# Patient Record
(demographics unavailable — no encounter records)

---

## 2024-11-01 NOTE — PHYSICAL EXAM
[de-identified] : RIGHT HIP Inc Clean and dry no drainage - dressing removed - good ROM Sensation intact neg impingement Motor 5/5 to LE with some weakness to hip flexor  +1 edema LE  Xray 3 RIGHT HIP views hip/pelvis - Implants good position and well fixed - no fracture or dislocation and Leg length wnl

## 2024-11-01 NOTE — HISTORY OF PRESENT ILLNESS
[Dull/Aching] : dull/aching [de-identified] : 11/1/24: PO #1. 16 days s/p R KASIE. Finished at home PT- feels it was beneficial. Getting better every day, but pain is already less now compared to prior to surgery. No relief with oxy, but good relief and help sleeping with Vicodin. Ambulates without assistance.   Previous doc:  2/27/24: 60yo M with right hip/leg pain for the past ~8 months with no injury. Pain radiates into his thigh. Has some groin pain. States he has known OA in the R hip. 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 Admits to increasing pain and difficulty with prolonged walking/standing, startup, and stairs. He was worked up for the L-spine- had EMG, XR done. Had a LESI done by outside pain management that did not provide sig relief. Some relief with heat and stretching  7/2/24: f/up R hip. He had IA hip CSI ~3 months ago - pain has gradually returned over the past month. He is also having some right knee pain and notes occasional "popping." Has relief with bracing.   [de-identified] : home therapy  [de-identified] : MAYELIN FERRO

## 2024-11-01 NOTE — DISCUSSION/SUMMARY
[de-identified] : We discussed the patient's progress.  The patient was reminded of their hip dislocation precautions and their antibiotic prophylaxis.  We discussed continued physical therapy and/or a home exercise program.  Questions about their hip replacement and future follow up were answered and discussed. The incision was inspected and was clean and dry with no drainage.  The patient was instructed to call for fevers, chills, wound drainage, wound opening, redness, or any other concerns.  Entered by Diandra Phillips acting as a scribe.

## 2024-11-01 NOTE — ASSESSMENT
[FreeTextEntry1] : 2/27/24: Adv R hip OA. Discussed anti-inflammatories, PT/HEP, IA hip CSI, and briefly KASIE. He had prior workup and treatment of his L-spine (XR, EMG, LESI) with no sig relief. He is well informed and would like to proceed with PT/HEP and IA right hip CSI with Dr. Kimball at this point. f/up in 6 weeks, may discuss KASIE depending on how he's doing  7/2/24: Had relief in the hip with IA right hip CSI with Dr. Kimball until recently. Will set him up for repeat IA hip CSI. He would also like to plan for R KASIE in October (after 10/11/24). Discussed KASIE, r/b/a, and preop/postop periods in detail. Will obtain CT Right Hip to eval for bone loss and for presurgical eval.   11/1/24: PO #1. 2 weeks s/p R KASIE. XR look good. Has good motion and function, pain is tolerable. Given Rx for outpatient PT. He expressed interest in driving to [place] sometime in December- discussed risk of blood clots due to prolonged periods of sitting- told him to try and get up and move every hour- will be about 6 weeks postop by the time he goes to [place]. F/up 6 weeks for re eval or before he goes to [place]

## 2024-12-03 NOTE — HISTORY OF PRESENT ILLNESS
[de-identified] : 12/3/24: 2 months postop min pain.  Some left knee pain that started after doing PT since last visit.  Slow improvement with heat and exercises.  Previous doc:  2/27/24: 62yo M with right hip/leg pain for the past ~8 months with no injury. Pain radiates into his thigh. Has some groin pain. States he has known OA in the R hip. 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 Admits to increasing pain and difficulty with prolonged walking/standing, startup, and stairs. He was worked up for the L-spine- had EMG, XR done. Had a LESI done by outside pain management that did not provide sig relief. Some relief with heat and stretching  7/2/24: f/up R hip. He had IA hip CSI ~3 months ago - pain has gradually returned over the past month. He is also having some right knee pain and notes occasional "popping." Has relief with bracing.  11/1/24: PO #1. 16 days s/p R KASIE. Finished at home PT- feels it was beneficial. Getting better every day, but pain is already less now compared to prior to surgery. No relief with oxy, but good relief and help sleeping with Vicodin. Ambulates without assistance.  [de-identified] : doing pt

## 2024-12-03 NOTE — ASSESSMENT
[FreeTextEntry1] : Previous doc: 2/27/24: Adv R hip OA. Discussed anti-inflammatories, PT/HEP, IA hip CSI, and briefly KASIE. He had prior workup and treatment of his L-spine (XR, EMG, LESI) with no sig relief. He is well informed and would like to proceed with PT/HEP and IA right hip CSI with Dr. Kimball at this point. f/up in 6 weeks, may discuss KASIE depending on how he's doing  7/2/24: Had relief in the hip with IA right hip CSI with Dr. Kimball until recently. Will set him up for repeat IA hip CSI. He would also like to plan for R KASIE in October (after 10/11/24). Discussed KASIE, r/b/a, and preop/postop periods in detail. Will obtain CT Right Hip to eval for bone loss and for presurgical eval.  11/1/24: PO #1. 2 weeks s/p R KASIE. XR look good. Has good motion and function, pain is tolerable. Given Rx for outpatient PT. He expressed interest in driving to [place] sometime in December- discussed risk of blood clots due to prolonged periods of sitting- told him to try and get up and move every hour- will be about 6 weeks postop by the time he goes to [place]. F/up 6 weeks for re eval or before he goes to [place]  12/3/24: 2 months postop doing very well.  Left knee mild OA exacerbated with PT, improving, declined inj at this point.

## 2024-12-03 NOTE — DISCUSSION/SUMMARY
[de-identified] : The patient was advised of the diagnosis.  The natural history of the pathology was explained in full to the patient in layman's terms. All questions were answered.  The risks and benefits of surgical and non-surgical treatment alternatives were explained in full to the patient.dx  I saw the patient under the supervision of Dr. De Jesus and followed his plan of care.

## 2024-12-03 NOTE — PHYSICAL EXAM
[Right] : right hip with pelvis [Components well fixed, in good position] : Components well fixed, in good position [Left] : left knee [AP] : anteroposterior [Lateral] : lateral [[place]] : skyline [Mild tricompartmental OA medial narrowing] : Mild tricompartmental OA medial narrowing [de-identified] : Right hip: No swelling.  Inc healed  No pain with ROM.  NVI.  Walks without assistance.  left knee: No effusion.  Medial joint line tenderness.  ROM 0-125.  Lig stable.  NVI.

## 2025-01-07 NOTE — ASSESSMENT
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 However, without typical features of DM such as perivascular inflammation of perimysium or perifascicular endomysium, perifascicular atrophy, capillary abnormalities or CD4 predominance. 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 = Switched to MTX in 2023 in clinical remission since =Muscle strength improved in all muscle groups, but with slight myalgias and residual weakness in lower extremities despite normalization of CK. Symptoms more likely attributable to moderate-advanced hip OA and/or lumbar radiculopathy (documented on EMG/NCV), however should rule out inflammatory myositis. 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 = Pt follows urology closely since before, no concern for occult prostate Ca per pt colonoscopy 2022 showing only diverticulosis  Plan: -check CBC CMP, and CK, repeat serology -cw mtx 8 tabs weekly but consider to slowly lower the dose to d/c -recommend PT but pt deferring  -Malignancy workup has been negative in [id number], will repeat CT chest / abdomen if repeat NXP2 positive -scheduled to see GI - may nee repeat endoscopy / colonoscopy  HM: = Refuses Pneumonia and Shingles vaccines. = DEXA 2022 - wnl, = Infection screenin2022 Quant, HBV HCV neg.  RTC 3 months.

## 2025-01-07 NOTE — HISTORY OF PRESENT ILLNESS
[de-identified] : Last was seen by Dr Vega in MAY, 2024 [FreeTextEntry1] : INTERVAL HISTORY : ================= has right THR on 10/16/2024- on intense PT , improving, but still difficulties with walking/ pain in knees no muscle pain, no muscle weakness continued on MTX 20 mg a week

## 2025-01-07 NOTE — HISTORY OF PRESENT ILLNESS
[de-identified] : Last was seen by Dr Vega in MAY, 2024 [FreeTextEntry1] : INTERVAL HISTORY : ================= has right THR on 10/16/2024- on intense PT , improving, but still difficulties with walking/ pain in knees no muscle pain, no muscle weakness continued on MTX 20 mg a week

## 2025-01-07 NOTE — ASSESSMENT
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 However, without typical features of DM such as perivascular inflammation of perimysium or perifascicular endomysium, perifascicular atrophy, capillary abnormalities or CD4 predominance. 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 = Switched to MTX in 2023 in clinical remission since =Muscle strength improved in all muscle groups, but with slight myalgias and residual weakness in lower extremities despite normalization of CK. Symptoms more likely attributable to moderate-advanced hip OA and/or lumbar radiculopathy (documented on EMG/NCV), however should rule out inflammatory myositis. 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 = Pt follows urology closely since before, no concern for occult prostate Ca per pt colonoscopy 2022 showing only diverticulosis  Plan: -check CBC CMP, and CK, repeat serology -cw mtx 8 tabs weekly but consider to slowly lower the dose to d/c -recommend PT but pt deferring  -Malignancy workup has been negative in [id number], will repeat CT chest / abdomen if repeat NXP2 positive -scheduled to see GI - may nee repeat endoscopy / colonoscopy  HM: = Refuses Pneumonia and Shingles vaccines. = DEXA 2022 - wnl, = Infection screenin2022 Quant, HBV HCV neg.  RTC 3 months.

## 2025-01-07 NOTE — PHYSICAL EXAM
[General Appearance - Alert] : alert [General Appearance - In No Acute Distress] : in no acute distress [Sclera] : the sclera and conjunctiva were normal [Extraocular Movements] : extraocular movements were intact [Outer Ear] : the ears and nose were normal in appearance [Neck Appearance] : the appearance of the neck was normal [Auscultation Breath Sounds / Voice Sounds] : lungs were clear to auscultation bilaterally [Heart Rate And Rhythm] : heart rate was normal and rhythm regular [Heart Sounds] : normal S1 and S2 [Abdomen Soft] : soft [Abdomen Tenderness] : non-tender [] : no rash [Sensation] : the sensory exam was normal to light touch and pinprick [Motor Exam] : the motor exam was normal [Impaired Insight] : insight and judgment were intact [FreeTextEntry1] : Difficulties getting up from sitting to standing position, mostly from knee pain; No synovitis. 5/5 muscle strength in neck, shoulders, hands, thighs, legs, feet. 4/5 Strength b/lL thigh.

## 2025-01-07 NOTE — REVIEW OF SYSTEMS
[Chest Pain] : no chest pain [Anxiety] : no anxiety [Muscle Weakness] : no muscle weakness [Swollen Glands] : no swollen glands [Negative] : Neurological [FreeTextEntry9] : Has occasional leg stiffness and R shoulder pain

## 2025-05-29 NOTE — ASSESSMENT
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 However, without typical features of DM such as perivascular inflammation of perimysium or perifascicular endomysium, perifascicular atrophy, capillary abnormalities or CD4 predominance. 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 = Switched to MTX in 2023 in clinical remission since =Muscle strength improved in all muscle groups, but with slight myalgias and residual weakness in lower extremities despite normalization of CK. Symptoms more likely attributable to moderate-advanced hip OA and/or lumbar radiculopathy (documented on EMG/NCV), however should rule out inflammatory myositis. 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 = DEXA 2022 - wnl, = Infection screenin2022 Quant, HBV HCV neg.  RTC 3 months.

## 2025-05-29 NOTE — HISTORY OF PRESENT ILLNESS
[de-identified] : Last was seen in January, 2025 [FreeTextEntry1] : INTERVAL HISTORY : ================= lowered MTX to 15 mg a week- no worsening of muscle function, no rash function /  walking improved after THR

## 2025-05-29 NOTE — PHYSICAL EXAM
[General Appearance - Alert] : alert [General Appearance - In No Acute Distress] : in no acute distress [Sclera] : the sclera and conjunctiva were normal [Extraocular Movements] : extraocular movements were intact [Outer Ear] : the ears and nose were normal in appearance [Neck Appearance] : the appearance of the neck was normal [Auscultation Breath Sounds / Voice Sounds] : lungs were clear to auscultation bilaterally [Heart Rate And Rhythm] : heart rate was normal and rhythm regular [Heart Sounds] : normal S1 and S2 [Abdomen Soft] : soft [Abdomen Tenderness] : non-tender [] : no rash [Sensation] : the sensory exam was normal to light touch and pinprick [Motor Exam] : the motor exam was normal [Impaired Insight] : insight and judgment were intact [FreeTextEntry1] : good ROM in hips , No synovitis. 5/5 muscle strength in neck, sdeltoid, biceps, quads, hip abductors

## 2025-05-29 NOTE — ASSESSMENT
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 However, without typical features of DM such as perivascular inflammation of perimysium or perifascicular endomysium, perifascicular atrophy, capillary abnormalities or CD4 predominance. 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 = Switched to MTX in 2023 in clinical remission since =Muscle strength improved in all muscle groups, but with slight myalgias and residual weakness in lower extremities despite normalization of CK. Symptoms more likely attributable to moderate-advanced hip OA and/or lumbar radiculopathy (documented on EMG/NCV), however should rule out inflammatory myositis. 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 = DEXA 2022 - wnl, = Infection screenin2022 Quant, HBV HCV neg.  RTC 3 months.

## 2025-05-29 NOTE — REVIEW OF SYSTEMS
[Chest Pain] : no chest pain [As Noted in HPI] : as noted in HPI [Anxiety] : no anxiety [Negative] : Heme/Lymph [FreeTextEntry9] : Has occasional leg stiffness and R shoulder pain

## 2025-05-29 NOTE — HISTORY OF PRESENT ILLNESS
[de-identified] : Last was seen in January, 2025 [FreeTextEntry1] : INTERVAL HISTORY : ================= lowered MTX to 15 mg a week- no worsening of muscle function, no rash function /  walking improved after THR